# Patient Record
Sex: MALE | Race: WHITE | HISPANIC OR LATINO | Employment: FULL TIME | URBAN - METROPOLITAN AREA
[De-identification: names, ages, dates, MRNs, and addresses within clinical notes are randomized per-mention and may not be internally consistent; named-entity substitution may affect disease eponyms.]

---

## 2022-08-22 ENCOUNTER — APPOINTMENT (OUTPATIENT)
Dept: LAB | Age: 32
End: 2022-08-22
Payer: COMMERCIAL

## 2022-08-22 DIAGNOSIS — E78.5 HYPERLIPIDEMIA, UNSPECIFIED HYPERLIPIDEMIA TYPE: ICD-10-CM

## 2022-08-22 LAB
ALBUMIN SERPL BCP-MCNC: 3.8 G/DL (ref 3.5–5)
ALP SERPL-CCNC: 48 U/L (ref 46–116)
ALT SERPL W P-5'-P-CCNC: 46 U/L (ref 12–78)
ANION GAP SERPL CALCULATED.3IONS-SCNC: 8 MMOL/L (ref 4–13)
AST SERPL W P-5'-P-CCNC: 26 U/L (ref 5–45)
BASOPHILS # BLD AUTO: 0.12 THOUSANDS/ΜL (ref 0–0.1)
BASOPHILS NFR BLD AUTO: 1 % (ref 0–1)
BILIRUB SERPL-MCNC: 0.58 MG/DL (ref 0.2–1)
BUN SERPL-MCNC: 15 MG/DL (ref 5–25)
CALCIUM SERPL-MCNC: 9 MG/DL (ref 8.3–10.1)
CHLORIDE SERPL-SCNC: 105 MMOL/L (ref 96–108)
CHOLEST SERPL-MCNC: 203 MG/DL
CO2 SERPL-SCNC: 25 MMOL/L (ref 21–32)
CREAT SERPL-MCNC: 1.22 MG/DL (ref 0.6–1.3)
EOSINOPHIL # BLD AUTO: 0.31 THOUSAND/ΜL (ref 0–0.61)
EOSINOPHIL NFR BLD AUTO: 3 % (ref 0–6)
ERYTHROCYTE [DISTWIDTH] IN BLOOD BY AUTOMATED COUNT: 12.5 % (ref 11.6–15.1)
ERYTHROCYTE [SEDIMENTATION RATE] IN BLOOD: 12 MM/HOUR (ref 0–14)
GFR SERPL CREATININE-BSD FRML MDRD: 78 ML/MIN/1.73SQ M
GLUCOSE P FAST SERPL-MCNC: 96 MG/DL (ref 65–99)
HCT VFR BLD AUTO: 44.9 % (ref 36.5–49.3)
HDLC SERPL-MCNC: 34 MG/DL
HGB BLD-MCNC: 14.8 G/DL (ref 12–17)
IMM GRANULOCYTES # BLD AUTO: 0.12 THOUSAND/UL (ref 0–0.2)
IMM GRANULOCYTES NFR BLD AUTO: 1 % (ref 0–2)
LDLC SERPL CALC-MCNC: 131 MG/DL (ref 0–100)
LYMPHOCYTES # BLD AUTO: 3.49 THOUSANDS/ΜL (ref 0.6–4.47)
LYMPHOCYTES NFR BLD AUTO: 34 % (ref 14–44)
MCH RBC QN AUTO: 28.6 PG (ref 26.8–34.3)
MCHC RBC AUTO-ENTMCNC: 33 G/DL (ref 31.4–37.4)
MCV RBC AUTO: 87 FL (ref 82–98)
MONOCYTES # BLD AUTO: 0.78 THOUSAND/ΜL (ref 0.17–1.22)
MONOCYTES NFR BLD AUTO: 8 % (ref 4–12)
NEUTROPHILS # BLD AUTO: 5.37 THOUSANDS/ΜL (ref 1.85–7.62)
NEUTS SEG NFR BLD AUTO: 53 % (ref 43–75)
NONHDLC SERPL-MCNC: 169 MG/DL
NRBC BLD AUTO-RTO: 0 /100 WBCS
PLATELET # BLD AUTO: 384 THOUSANDS/UL (ref 149–390)
PMV BLD AUTO: 9.9 FL (ref 8.9–12.7)
POTASSIUM SERPL-SCNC: 4.2 MMOL/L (ref 3.5–5.3)
PROT SERPL-MCNC: 8 G/DL (ref 6.4–8.4)
RBC # BLD AUTO: 5.18 MILLION/UL (ref 3.88–5.62)
SODIUM SERPL-SCNC: 138 MMOL/L (ref 135–147)
TRIGL SERPL-MCNC: 190 MG/DL
TSH SERPL DL<=0.05 MIU/L-ACNC: 2.61 UIU/ML (ref 0.45–4.5)
WBC # BLD AUTO: 10.19 THOUSAND/UL (ref 4.31–10.16)

## 2022-08-22 PROCEDURE — 36415 COLL VENOUS BLD VENIPUNCTURE: CPT

## 2022-08-22 PROCEDURE — 80053 COMPREHEN METABOLIC PANEL: CPT

## 2022-08-22 PROCEDURE — 85025 COMPLETE CBC W/AUTO DIFF WBC: CPT

## 2022-08-22 PROCEDURE — 84443 ASSAY THYROID STIM HORMONE: CPT

## 2022-08-22 PROCEDURE — 80061 LIPID PANEL: CPT

## 2022-08-22 PROCEDURE — 85652 RBC SED RATE AUTOMATED: CPT

## 2024-12-13 ENCOUNTER — OFFICE VISIT (OUTPATIENT)
Dept: GASTROENTEROLOGY | Facility: CLINIC | Age: 34
End: 2024-12-13
Payer: COMMERCIAL

## 2024-12-13 VITALS
BODY MASS INDEX: 31.97 KG/M2 | HEART RATE: 67 BPM | WEIGHT: 241.2 LBS | HEIGHT: 73 IN | SYSTOLIC BLOOD PRESSURE: 137 MMHG | DIASTOLIC BLOOD PRESSURE: 88 MMHG

## 2024-12-13 DIAGNOSIS — R10.13 EPIGASTRIC PAIN: Primary | ICD-10-CM

## 2024-12-13 DIAGNOSIS — R19.7 DIARRHEA OF PRESUMED INFECTIOUS ORIGIN: ICD-10-CM

## 2024-12-13 DIAGNOSIS — K60.2 ANAL FISSURE: ICD-10-CM

## 2024-12-13 PROCEDURE — 99203 OFFICE O/P NEW LOW 30 MIN: CPT | Performed by: INTERNAL MEDICINE

## 2024-12-13 NOTE — PROGRESS NOTES
Name: Paul Alvarez      : 1990      MRN: [id number]  Encounter Provider: Lobo Lane MD  Encounter Date: 2024   Encounter department: [facility] GASTROENTEROLOGY [place] [address]  :  Assessment & Plan  Epigastric pain  Agree that symptoms are highly suggestive of biliary dyskinesia and CCK stimulated HIDA scan would be the test of choice for further evaluation.  Will reorder this in the hopes it will be covered.  Differential diagnosis would also include peptic ulcer disease, H. pylori, etc. though these seem much less likely based on patient's symptomatology.  Contingency might include further evaluation with EGD.  Will avoid further NSAIDs  Orders:    NM hepatobiliary w rx; Future    Diarrhea of presumed infectious origin  Likely traveler's diarrhea, now resolved.  May have developed postinfectious functional symptoms contributing to his ongoing abdominal discomfort       Anal fissure  We discussed use of OTC fiber and sitz baths.  Discussed possible further evaluation with colonoscopy but will hold off on this for now           History of Present Illness   HPI  Paul Alvarez is a 33 y.o. male who presents with ongoing abdominal pain.  Reports symptoms began following a trip to [place] in  at which time he developed profuse watery diarrhea.  During this time he took a fair amount of NSAIDs but has not been using these since.  Has since developed onset of upper abdominal pain typically 20 to 30 minutes after eating fatty foods.  Abdominal ultrasound was unrevealing and CCK stimulated HIDA scan ordered to evaluate for biliary dyskinesia but denied by patient's insurance.  Symptoms have been gradually improving over time.  Patient also reports developing scant bright red spotting of blood with bowel movements especially on occasions when he has harder stool and straining and associated with sharp tearing pain.  This has occurred episodically over the past several months lasting  "several days at a time.      Review of Systems       Objective   /88 (BP Location: Left arm, Patient Position: Sitting, Cuff Size: Standard)   Pulse 67   Ht 6' 1\" (1.854 m)   Wt 109 kg (241 lb 3.2 oz)   BMI 31.82 kg/m²      Physical Exam  Vitals and nursing note reviewed.   Constitutional:       General: He is not in acute distress.  HENT:      Head: Normocephalic and atraumatic.      Mouth/Throat:      Mouth: Mucous membranes are moist.   Eyes:      General: No scleral icterus.     Pupils: Pupils are equal, round, and reactive to light.   Cardiovascular:      Rate and Rhythm: Normal rate and regular rhythm.   Pulmonary:      Effort: Pulmonary effort is normal. No respiratory distress.   Abdominal:      General: There is no distension.      Palpations: Abdomen is soft.      Tenderness: There is no abdominal tenderness. There is no guarding or rebound.   Musculoskeletal:         General: Normal range of motion.      Cervical back: Normal range of motion and neck supple.      Right lower leg: No edema.      Left lower leg: No edema.   Skin:     General: Skin is warm and dry.   Neurological:      General: No focal deficit present.      Mental Status: He is alert and oriented to person, place, and time.   Psychiatric:         Mood and Affect: Mood normal.         Behavior: Behavior normal.           "

## 2025-01-02 ENCOUNTER — HOSPITAL ENCOUNTER (OUTPATIENT)
Dept: NUCLEAR MEDICINE | Facility: HOSPITAL | Age: 35
Discharge: HOME | End: 2025-01-02
Attending: INTERNAL MEDICINE
Payer: COMMERCIAL

## 2025-01-02 DIAGNOSIS — R10.13 EPIGASTRIC PAIN: ICD-10-CM

## 2025-01-02 PROCEDURE — A9537 TC99M MEBROFENIN: HCPCS

## 2025-01-02 PROCEDURE — 78227 HEPATOBIL SYST IMAGE W/DRUG: CPT

## 2025-01-02 RX ORDER — SINCALIDE 5 UG/5ML
0.02 INJECTION, POWDER, LYOPHILIZED, FOR SOLUTION INTRAVENOUS ONCE
Status: COMPLETED | OUTPATIENT
Start: 2025-01-02 | End: 2025-01-02

## 2025-01-02 RX ADMIN — SINCALIDE 2.2 MCG: 5 INJECTION, POWDER, LYOPHILIZED, FOR SOLUTION INTRAVENOUS at 14:25

## 2025-01-31 ENCOUNTER — RESULTS FOLLOW-UP (OUTPATIENT)
Dept: GASTROENTEROLOGY | Facility: CLINIC | Age: 35
End: 2025-01-31

## 2025-01-31 DIAGNOSIS — K82.8 BILIARY DYSKINESIA: Primary | ICD-10-CM

## 2025-01-31 NOTE — RESULT ENCOUNTER NOTE
Please call the patient regarding results.  HIDA scan consistent with biliary dyskinesia.  This is likely the cause of his pain and treatment is generally cholecystectomy if the pain persists, though this is unlikely to cause any serious complications.  Will arrange referral to surgery to discuss this further.

## 2025-02-03 NOTE — TELEPHONE ENCOUNTER
Patient returned phone call. Call warm transferred to clinical team to review results with patient.

## 2025-02-03 NOTE — TELEPHONE ENCOUNTER
----- Message from Lobo Lane MD sent at 1/31/2025  4:21 PM EST -----  Please call the patient regarding results.  HIDA scan consistent with biliary dyskinesia.  This is likely the cause of his pain and treatment is generally cholecystectomy if the pain persists, though this is unlikely to cause any serious complications.  Will arrange referral to surgery to discuss this further.

## 2025-02-03 NOTE — TELEPHONE ENCOUNTER
Pt transferred to myself by Shahla.    HIDA scan results and recommendations reviewed with pt. Pt has general surgery consult scheduled 02/13.

## 2025-02-13 ENCOUNTER — OFFICE VISIT (OUTPATIENT)
Dept: SURGERY | Facility: CLINIC | Age: 35
End: 2025-02-13
Payer: COMMERCIAL

## 2025-02-13 VITALS
TEMPERATURE: 97.4 F | HEART RATE: 70 BPM | OXYGEN SATURATION: 98 % | BODY MASS INDEX: 32.02 KG/M2 | SYSTOLIC BLOOD PRESSURE: 132 MMHG | HEIGHT: 73 IN | RESPIRATION RATE: 16 BRPM | WEIGHT: 241.6 LBS | DIASTOLIC BLOOD PRESSURE: 82 MMHG

## 2025-02-13 DIAGNOSIS — K82.8 BILIARY DYSKINESIA: ICD-10-CM

## 2025-02-13 DIAGNOSIS — R10.32 LEFT LOWER QUADRANT ABDOMINAL PAIN: Primary | ICD-10-CM

## 2025-02-13 PROCEDURE — 99214 OFFICE O/P EST MOD 30 MIN: CPT

## 2025-02-13 NOTE — PROGRESS NOTES
Name: Paul Alvarez      : 1990      MRN: [id number]  Encounter Provider: Mary Anna  Encounter Date: 2025   [facility]  :  Assessment & Plan  Biliary dyskinesia  Pt is a 33 yo M following w GI for epigastric pain presenting as a consult for possible cholecystectomy. Pt completed HIDA scan 2025 showing biliary dyskinesia w EF 8%.   History completed today significant for improving pain after meals and mild daily epigastric and both RUQ and LUQ pain.      A/P  Given history/atypical presentation of stated pain, ordered CT scan to assess for other pathologies outside of the biliary tract.   Defer cholecystectomy until CT results are available.  F/U in one week.     Orders:    Ambulatory Referral to General Surgery    Left lower quadrant abdominal pain    Orders:    CT abdomen pelvis w contrast; Future        History of Present Illness   HPI  Paul Alvarez is a 34 y.o. male who presents as a consult for possible cholecystectomy. Pt reports epigastric pain began in 2024 while pt was in [place]. While there, he developed traveler's diarrhea. Pt recovered shortly, but had continued epigastric pain. Pt stated pain improved since the inciting event but does endorse daily mild epigastric pain throughout the day. He endorses worsening pain without eating and somewhat worsening pain after eating at times. He had one fever back in July but has not had any F/C, N/V since.     History obtained from: patient    Review of Systems   Constitutional:  Negative for activity change, appetite change, chills, fatigue and fever.   Cardiovascular:  Negative for chest pain and leg swelling.   Gastrointestinal:  Positive for abdominal pain. Negative for abdominal distention, blood in stool, constipation, diarrhea, nausea and vomiting.   Genitourinary:  Negative for dysuria, flank pain and hematuria.   Musculoskeletal:  Negative for back pain.   Skin:  Negative  "for rash.          Objective   /82 (Patient Position: Sitting)   Pulse 70   Temp (!) 97.4 °F (36.3 °C) (Temporal)   Resp 16   Ht 6' 1\" (1.854 m)   Wt 110 kg (241 lb 9.6 oz)   SpO2 98%   BMI 31.88 kg/m²      Physical Exam  Vitals reviewed.   Constitutional:       General: He is not in acute distress.     Appearance: Normal appearance. He is not ill-appearing.   HENT:      Head: Normocephalic and atraumatic.      Mouth/Throat:      Pharynx: Oropharynx is clear.   Pulmonary:      Effort: Pulmonary effort is normal.   Abdominal:      General: Bowel sounds are normal. There is no distension.      Palpations: Abdomen is soft.      Tenderness: There is no abdominal tenderness. There is no guarding.   Musculoskeletal:         General: No swelling, tenderness, deformity or signs of injury. Normal range of motion.   Skin:     General: Skin is warm and dry.   Neurological:      General: No focal deficit present.      Mental Status: He is alert and oriented to person, place, and time.   Psychiatric:         Mood and Affect: Mood normal.         Behavior: Behavior normal.           "

## 2025-02-17 ENCOUNTER — TELEPHONE (OUTPATIENT)
Age: 35
End: 2025-02-17

## 2025-02-17 NOTE — TELEPHONE ENCOUNTER
Pt called regarding CT scan he is suppose to have. Provided him with Central Scheduling to call and schedule

## 2025-02-19 ENCOUNTER — TELEPHONE (OUTPATIENT)
Age: 35
End: 2025-02-19

## 2025-02-19 NOTE — TELEPHONE ENCOUNTER
Patient called In because he scheduled his CT for this Sunday but just received notice his insurance has denied the test. He did not cancel CT yet because he wanted to see if there was anything we could do. Please call patient back if canceling test would be appropriate. He can be reached at [phone]

## 2025-05-01 ENCOUNTER — APPOINTMENT (EMERGENCY)
Dept: CT IMAGING | Facility: HOSPITAL | Age: 35
End: 2025-05-01
Payer: COMMERCIAL

## 2025-05-01 ENCOUNTER — HOSPITAL ENCOUNTER (EMERGENCY)
Facility: HOSPITAL | Age: 35
Discharge: HOME | End: 2025-05-01
Attending: EMERGENCY MEDICINE
Payer: COMMERCIAL

## 2025-05-01 VITALS
RESPIRATION RATE: 16 BRPM | WEIGHT: 235.01 LBS | BODY MASS INDEX: 31.01 KG/M2 | DIASTOLIC BLOOD PRESSURE: 89 MMHG | HEART RATE: 84 BPM | OXYGEN SATURATION: 97 % | SYSTOLIC BLOOD PRESSURE: 164 MMHG | TEMPERATURE: 98.4 F

## 2025-05-01 DIAGNOSIS — R10.9 ABDOMINAL PAIN: Primary | ICD-10-CM

## 2025-05-01 LAB
ALBUMIN SERPL BCG-MCNC: 5.1 G/DL (ref 3.5–5)
ALP SERPL-CCNC: 46 U/L (ref 34–104)
ALT SERPL W P-5'-P-CCNC: 30 U/L (ref 7–52)
ANION GAP SERPL CALCULATED.3IONS-SCNC: 12 MMOL/L (ref 4–13)
AST SERPL W P-5'-P-CCNC: 40 U/L (ref 13–39)
BASOPHILS # BLD AUTO: 0.04 THOUSANDS/ÂΜL (ref 0–0.1)
BASOPHILS NFR BLD AUTO: 0 % (ref 0–1)
BILIRUB SERPL-MCNC: 0.94 MG/DL (ref 0.2–1)
BUN SERPL-MCNC: 12 MG/DL (ref 5–25)
CALCIUM SERPL-MCNC: 9.7 MG/DL (ref 8.4–10.2)
CHLORIDE SERPL-SCNC: 102 MMOL/L (ref 96–108)
CO2 SERPL-SCNC: 24 MMOL/L (ref 21–32)
CREAT SERPL-MCNC: 1.25 MG/DL (ref 0.6–1.3)
EOSINOPHIL # BLD AUTO: 0.07 THOUSAND/ÂΜL (ref 0–0.61)
EOSINOPHIL NFR BLD AUTO: 1 % (ref 0–6)
ERYTHROCYTE [DISTWIDTH] IN BLOOD BY AUTOMATED COUNT: 11.7 % (ref 11.6–15.1)
GFR SERPL CREATININE-BSD FRML MDRD: 74 ML/MIN/1.73SQ M
GLUCOSE SERPL-MCNC: 87 MG/DL (ref 65–140)
HCT VFR BLD AUTO: 45.8 % (ref 36.5–49.3)
HGB BLD-MCNC: 15.3 G/DL (ref 12–17)
IMM GRANULOCYTES # BLD AUTO: 0.04 THOUSAND/UL (ref 0–0.2)
IMM GRANULOCYTES NFR BLD AUTO: 0 % (ref 0–2)
LIPASE SERPL-CCNC: 10 U/L (ref 11–82)
LYMPHOCYTES # BLD AUTO: 2.62 THOUSANDS/ÂΜL (ref 0.6–4.47)
LYMPHOCYTES NFR BLD AUTO: 29 % (ref 14–44)
MCH RBC QN AUTO: 28.9 PG (ref 26.8–34.3)
MCHC RBC AUTO-ENTMCNC: 33.4 G/DL (ref 31.4–37.4)
MCV RBC AUTO: 86 FL (ref 82–98)
MONOCYTES # BLD AUTO: 0.58 THOUSAND/ÂΜL (ref 0.17–1.22)
MONOCYTES NFR BLD AUTO: 6 % (ref 4–12)
NEUTROPHILS # BLD AUTO: 5.76 THOUSANDS/ÂΜL (ref 1.85–7.62)
NEUTS SEG NFR BLD AUTO: 64 % (ref 43–75)
NRBC BLD AUTO-RTO: 0 /100 WBCS
PLATELET # BLD AUTO: 368 THOUSANDS/UL (ref 149–390)
PMV BLD AUTO: 9.5 FL (ref 8.9–12.7)
POTASSIUM SERPL-SCNC: 3.6 MMOL/L (ref 3.5–5.3)
PROT SERPL-MCNC: 8.1 G/DL (ref 6.4–8.4)
RBC # BLD AUTO: 5.3 MILLION/UL (ref 3.88–5.62)
SODIUM SERPL-SCNC: 138 MMOL/L (ref 135–147)
WBC # BLD AUTO: 9.11 THOUSAND/UL (ref 4.31–10.16)

## 2025-05-01 PROCEDURE — 99285 EMERGENCY DEPT VISIT HI MDM: CPT | Performed by: EMERGENCY MEDICINE

## 2025-05-01 PROCEDURE — 74177 CT ABD & PELVIS W/CONTRAST: CPT

## 2025-05-01 PROCEDURE — 36415 COLL VENOUS BLD VENIPUNCTURE: CPT

## 2025-05-01 PROCEDURE — 83690 ASSAY OF LIPASE: CPT

## 2025-05-01 PROCEDURE — 85025 COMPLETE CBC W/AUTO DIFF WBC: CPT

## 2025-05-01 PROCEDURE — 99284 EMERGENCY DEPT VISIT MOD MDM: CPT

## 2025-05-01 PROCEDURE — 80053 COMPREHEN METABOLIC PANEL: CPT

## 2025-05-01 RX ORDER — PANTOPRAZOLE SODIUM 40 MG/1
40 TABLET, DELAYED RELEASE ORAL DAILY
Qty: 30 TABLET | Refills: 0 | Status: SHIPPED | OUTPATIENT
Start: 2025-05-01

## 2025-05-01 RX ORDER — SUCRALFATE 1 G/1
1 TABLET ORAL 4 TIMES DAILY
Qty: 60 TABLET | Refills: 0 | Status: SHIPPED | OUTPATIENT
Start: 2025-05-01

## 2025-05-01 RX ADMIN — IOHEXOL 100 ML: 350 INJECTION, SOLUTION INTRAVENOUS at 20:32

## 2025-05-01 NOTE — ED PROVIDER NOTES
Time reflects when diagnosis was documented in both MDM as applicable and the Disposition within this note       Time User Action Codes Description Comment    5/1/2025  9:40 PM Tripp Haynes Add [R10.9] Abdominal pain           ED Disposition       ED Disposition   Discharge    Condition   Stable    Date/Time   Thu May 1, 2025  9:40 PM    Comment   Paul Alvarez discharge to home/self care.                   Assessment & Plan       Medical Decision Making  34-year-old male presents for evaluation of abdominal pain.  Well-appearing male, no acute distress, resting comfortably in bed.  Mild epigastric and right upper quadrant tenderness.  Differentials include but not limited to gastritis, cholelithiasis, cholecystitis, hepatitis, pancreatitis.  Will obtain labs, imaging and treat symptomatically.    See ED course for further MDM.    Amount and/or Complexity of Data Reviewed  Labs: ordered. Decision-making details documented in ED Course.  Radiology: ordered. Decision-making details documented in ED Course.    Risk  Prescription drug management.        ED Course as of 05/03/25 0234   u May 01, 2025   1959 LIPASE(!): 10   1959 Comprehensive metabolic panel(!)  Unremarkable     1959 CBC and differential  Unremarkable     2131 CT abdomen pelvis with contrast  IMPRESSION:     1) No acute abdominal or pelvic pathology.     2) No bowel obstruction. Normal appendix. Evaluation for bowel inflammation elsewhere somewhat limited by underdistention and lack of oral contrast, however no convincing inflammatory changes. Please note mild inflammation may not be apparent.     3) No CT evidence of acute cholecystitis. No radiopaque cholelithiasis or choledocholithiasis. No biliary ductal dilatation.     4) Additional findings as above.     2132 Given normal CT findings, pt stable for discharge at this time. Will discharge with carafate and protonix for symptomatic management       Medications   iohexol (OMNIPAQUE) 350 MG/ML  injection (MULTI-DOSE) 100 mL (100 mL Intravenous Given 5/1/25 2032)       ED Risk Strat Scores                    No data recorded                            History of Present Illness       Chief Complaint   Patient presents with    Abdominal Pain     Pt reports having issues with his gallbladder since last summer. Reports lack of appetite, RUQ and LUQ abdominal pain for a few days. Denies other complaints       Past Medical History:   Diagnosis Date    Sleep apnea, obstructive       History reviewed. No pertinent surgical history.   History reviewed. No pertinent family history.       E-Cigarette/Vaping      E-Cigarette/Vaping Substances      I have reviewed and agree with the history as documented.     35 yo male presents for evaluation of abdominal pain. He has a hx of biliary dyskinesia, and reports 2 days of decreased appetite, RUQ and LUQ abdominal pain. No associated nausea or vomiting. Decreased PO intake, due to no appetite. Pain usually occurs after he eats, and he is afraid to eat because of the pain. He has been seen by general surgery outpatient, who recommends CT prior to cholecystectomy. Had some issurnace problems, which delayed the CT. He presents because the pain has been persistent, and not improving. Denies fever, chills, nausea, vomiting, diarrhea, chest pain.          Review of Systems   Constitutional:  Negative for fever.   Respiratory:  Negative for shortness of breath.    Gastrointestinal:  Positive for abdominal pain. Negative for nausea.   All other systems reviewed and are negative.          Objective       ED Triage Vitals [05/01/25 1817]   Temperature Pulse Blood Pressure Respirations SpO2 Patient Position - Orthostatic VS   98.4 °F (36.9 °C) 84 164/89 16 97 % Sitting      Temp Source Heart Rate Source BP Location FiO2 (%) Pain Score    Oral Monitor Right arm -- 5      Vitals      Date and Time Temp Pulse SpO2 Resp BP Pain Score FACES Pain Rating User   05/01/25 1817 98.4 °F (36.9  °C) 84 97 % 16 164/89 5 -- TB            Physical Exam  Vitals and nursing note reviewed.   Constitutional:       General: He is not in acute distress.     Appearance: He is well-developed.   HENT:      Head: Normocephalic and atraumatic.   Eyes:      Conjunctiva/sclera: Conjunctivae normal.   Cardiovascular:      Rate and Rhythm: Normal rate and regular rhythm.      Heart sounds: No murmur heard.  Pulmonary:      Effort: Pulmonary effort is normal. No respiratory distress.      Breath sounds: Normal breath sounds.   Abdominal:      Palpations: Abdomen is soft.      Tenderness: There is abdominal tenderness in the epigastric area.   Musculoskeletal:         General: No swelling.      Cervical back: Neck supple.   Skin:     General: Skin is warm and dry.      Capillary Refill: Capillary refill takes less than 2 seconds.   Neurological:      Mental Status: He is alert.   Psychiatric:         Mood and Affect: Mood normal.         Results Reviewed       Procedure Component Value Units Date/Time    Lipase [[id number]]  (Abnormal) Collected: 05/01/25 1853    Lab Status: Final result Specimen: Blood from Arm, Right Updated: 05/01/25 1944     Lipase 10 u/L     Comprehensive metabolic panel [[id number]]  (Abnormal) Collected: 05/01/25 1853    Lab Status: Final result Specimen: Blood from Arm, Right Updated: 05/01/25 1944     Sodium 138 mmol/L      Potassium 3.6 mmol/L      Chloride 102 mmol/L      CO2 24 mmol/L      ANION GAP 12 mmol/L      BUN 12 mg/dL      Creatinine 1.25 mg/dL      Glucose 87 mg/dL      Calcium 9.7 mg/dL      AST 40 U/L      ALT 30 U/L      Alkaline Phosphatase 46 U/L      Total Protein 8.1 g/dL      Albumin 5.1 g/dL      Total Bilirubin 0.94 mg/dL      eGFR 74 ml/min/1.73sq m     Narrative:      National Kidney Disease Foundation guidelines for Chronic Kidney Disease (CKD):     Stage 1 with normal or high GFR (GFR > 90 mL/min/1.73 square meters)    Stage 2 Mild CKD (GFR = 60-89 mL/min/1.73 square meters)     Stage 3A Moderate CKD (GFR = 45-59 mL/min/1.73 square meters)    Stage 3B Moderate CKD (GFR = 30-44 mL/min/1.73 square meters)    Stage 4 Severe CKD (GFR = 15-29 mL/min/1.73 square meters)    Stage 5 End Stage CKD (GFR <15 mL/min/1.73 square meters)  Note: GFR calculation is accurate only with a steady state creatinine    CBC and differential [[id number]] Collected: 05/01/25 8604    Lab Status: Final result Specimen: Blood from Arm, Right Updated: 05/01/25 1908     WBC 9.11 Thousand/uL      RBC 5.30 Million/uL      Hemoglobin 15.3 g/dL      Hematocrit 45.8 %      MCV 86 fL      MCH 28.9 pg      MCHC 33.4 g/dL      RDW 11.7 %      MPV 9.5 fL      Platelets 368 Thousands/uL      nRBC 0 /100 WBCs      Segmented % 64 %      Immature Grans % 0 %      Lymphocytes % 29 %      Monocytes % 6 %      Eosinophils Relative 1 %      Basophils Relative 0 %      Absolute Neutrophils 5.76 Thousands/µL      Absolute Immature Grans 0.04 Thousand/uL      Absolute Lymphocytes 2.62 Thousands/µL      Absolute Monocytes 0.58 Thousand/µL      Eosinophils Absolute 0.07 Thousand/µL      Basophils Absolute 0.04 Thousands/µL             CT abdomen pelvis with contrast   Final Interpretation by Yaa Kennedy MD (05/01 2110)      1) No acute abdominal or pelvic pathology.      2) No bowel obstruction. Normal appendix. Evaluation for bowel inflammation elsewhere somewhat limited by underdistention and lack of oral contrast, however no convincing inflammatory changes. Please note mild inflammation may not be apparent.      3) No CT evidence of acute cholecystitis. No radiopaque cholelithiasis or choledocholithiasis. No biliary ductal dilatation.      4) Additional findings as above.                     Workstation performed: [id number]             Procedures    ED Medication and Procedure Management   None     Discharge Medication List as of 5/1/2025  9:51 PM        START taking these medications    Details   pantoprazole (PROTONIX) 40 mg tablet  Take 1 tablet (40 mg total) by mouth daily, Starting Thu 5/1/2025, Normal      sucralfate (CARAFATE) 1 g tablet Take 1 tablet (1 g total) by mouth 4 (four) times a day, Starting Thu 5/1/2025, Normal           No discharge procedures on file.  ED SEPSIS DOCUMENTATION   Time reflects when diagnosis was documented in both MDM as applicable and the Disposition within this note       Time User Action Codes Description Comment    5/1/2025  9:40 PM Tripp Haynes Add [R10.9] Abdominal pain                  Tripp Haynes MD  05/03/25 8405

## 2025-05-02 NOTE — ED ATTENDING ATTESTATION
5/1/2025  I, Agueda Farley MD, saw and evaluated the patient. I have discussed the patient with the resident/non-physician practitioner and agree with the resident's/non-physician practitioner's findings, Plan of Care, and MDM as documented in the resident's/non-physician practitioner's note, except where noted. All available labs and Radiology studies were reviewed.  I was present for key portions of any procedure(s) performed by the resident/non-physician practitioner and I was immediately available to provide assistance.       At this point I agree with the current assessment done in the Emergency Department.  I have conducted an independent evaluation of this patient a history and physical is as follows:    34-year-old presented to the ER with intermittent upper abdominal pain.  Being evaluated by surgery for outpatient biliary disease.  Was supposed to get a CAT scan but has not yet.  No nausea vomiting.  Normal bowel movements.  No chest pain or trouble breathing.  No fevers or chills.  Asymptomatic at this time.  Abdomen soft, no tenderness.    Agree with plan, check abdominal labs, CT       ED Course         Critical Care Time  Procedures

## 2025-05-06 ENCOUNTER — TELEPHONE (OUTPATIENT)
Age: 35
End: 2025-05-06

## 2025-05-06 NOTE — TELEPHONE ENCOUNTER
Patient called in to say he completed his CT Scan and is wondering now what the next step should be.  Please call the patient to advise, thanks.

## 2025-05-09 NOTE — TELEPHONE ENCOUNTER
Patient calling back as he has not gotten a return call on what his next steps would be. He is anxious to get a return call and can be reached at [phone]

## 2025-05-20 ENCOUNTER — OFFICE VISIT (OUTPATIENT)
Dept: SURGERY | Facility: CLINIC | Age: 35
End: 2025-05-20
Payer: COMMERCIAL

## 2025-05-20 ENCOUNTER — PREP FOR PROCEDURE (OUTPATIENT)
Dept: SURGERY | Facility: CLINIC | Age: 35
End: 2025-05-20

## 2025-05-20 VITALS
TEMPERATURE: 98.2 F | SYSTOLIC BLOOD PRESSURE: 122 MMHG | DIASTOLIC BLOOD PRESSURE: 77 MMHG | BODY MASS INDEX: 30.74 KG/M2 | HEART RATE: 77 BPM | OXYGEN SATURATION: 99 % | WEIGHT: 233 LBS

## 2025-05-20 DIAGNOSIS — K82.8 BILIARY DYSKINESIA: Primary | ICD-10-CM

## 2025-05-20 DIAGNOSIS — R10.9 ABDOMINAL PAIN: ICD-10-CM

## 2025-05-20 PROCEDURE — 99203 OFFICE O/P NEW LOW 30 MIN: CPT | Performed by: SURGERY

## 2025-05-20 RX ORDER — SODIUM CHLORIDE, SODIUM LACTATE, POTASSIUM CHLORIDE, CALCIUM CHLORIDE 600; 310; 30; 20 MG/100ML; MG/100ML; MG/100ML; MG/100ML
20 INJECTION, SOLUTION INTRAVENOUS CONTINUOUS
Status: CANCELLED | OUTPATIENT
Start: 2025-05-20

## 2025-05-20 RX ORDER — SUCRALFATE 1 G/1
1 TABLET ORAL 4 TIMES DAILY
Qty: 60 TABLET | Refills: 0 | Status: SHIPPED | OUTPATIENT
Start: 2025-05-20

## 2025-05-20 NOTE — PROGRESS NOTES
Name: Paul Alvarez      : 1990      MRN: [id number]  Encounter Provider: General Surgery Generic Physician Dino  Encounter Date: 2025   Encounter department: [facility] SURGERY [place]  :  Assessment & Plan  Abdominal pain  Pt to follow with GI to discuss possible EGD    Orders:    sucralfate (CARAFATE) 1 g tablet; Take 1 tablet (1 g total) by mouth 4 (four) times a day    Biliary dyskinesia  Long discussion with patient about risk and benefits of gallbladder removal.  The patient understands that this procedure may be nontherapeutic, or that his symptoms may not totally resolve with cholecystectomy.  Risks and benefits of bile leak and injury to surrounding structure were also discussed  Consent signed      History of Present Illness   HPI  Paul Alvarez is a 34 y.o. male who presents with abdominal pain.  Patient has been having pain over the course of a few months and has had a workup including a CAT scan of his abdomen and a HIDA scan.  The CAT scan of his abdomen did not show any pathology but the HIDA scan did show significant contractile dysfunction of the gallbladder.  Upon reviewing his symptoms the patient does say that when he eats fatty food he has bandlike pain across the middle of his abdomen that happens approximately 30 minutes after eating.  This is associated with some nausea but no vomiting. patient also says that he has separate left upper quadrant pain that is more intermittent in nature, is more of a burning type pain, and is sometimes associated with blood in his stools.  The patient denies any other associated symptoms.  History obtained from: patient    Review of Systems   Constitutional: Negative.    HENT: Negative.     Respiratory: Negative.     Cardiovascular: Negative.    Gastrointestinal:         As noted in HPI   Genitourinary: Negative.    Musculoskeletal: Negative.    Neurological: Negative.    Psychiatric/Behavioral: Negative.       Pertinent Medical  History         Medical History Reviewed by provider this encounter: .  Past Medical History   Past Medical History:   Diagnosis Date    Sleep apnea, obstructive      No past surgical history on file.  No family history on file.   reports that he has never smoked. He has never been exposed to tobacco smoke. He uses smokeless tobacco.  Current Outpatient Medications   Medication Instructions    pantoprazole (PROTONIX) 40 mg, Oral, Daily    sucralfate (CARAFATE) 1 g, Oral, 4 times daily   Allergies[1]   Medications Ordered Prior to Encounter[2]   Social History     Tobacco Use    Smoking status: Never     Passive exposure: Never    Smokeless tobacco: Current   Substance and Sexual Activity    Alcohol use: Not on file    Drug use: Not on file    Sexual activity: Not on file        Objective   /77 (BP Location: Right arm, Patient Position: Sitting, Cuff Size: Standard)   Pulse 77   Temp 98.2 °F (36.8 °C) (Temporal)   Wt 106 kg (233 lb)   SpO2 99%   BMI 30.74 kg/m²      Physical Exam  Constitutional:       Appearance: Normal appearance. He is not ill-appearing.   HENT:      Head: Normocephalic.      Right Ear: Tympanic membrane normal.      Left Ear: Tympanic membrane normal.      Nose: Nose normal. No congestion.     Eyes:      Pupils: Pupils are equal, round, and reactive to light.       Cardiovascular:      Rate and Rhythm: Normal rate and regular rhythm.      Heart sounds: No murmur heard.  Pulmonary:      Effort: Pulmonary effort is normal.      Breath sounds: Normal breath sounds. No stridor.   Abdominal:      General: Abdomen is flat. There is no distension.      Palpations: Abdomen is soft. There is no mass.      Tenderness: There is no rebound.      Hernia: No hernia is present.     Musculoskeletal:         General: No swelling or tenderness. Normal range of motion.      Cervical back: Normal range of motion and neck supple. No tenderness.     Skin:     General: Skin is warm.      Coloration: Skin is  not jaundiced.      Findings: No bruising.     Neurological:      General: No focal deficit present.      Mental Status: He is alert. Mental status is at baseline.     Psychiatric:         Mood and Affect: Mood normal.         Behavior: Behavior normal.         Thought Content: Thought content normal.         Judgment: Judgment normal.                  [1] No Known Allergies  [2]   Current Outpatient Medications on File Prior to Visit   Medication Sig Dispense Refill    pantoprazole (PROTONIX) 40 mg tablet Take 1 tablet (40 mg total) by mouth daily 30 tablet 0    [DISCONTINUED] sucralfate (CARAFATE) 1 g tablet Take 1 tablet (1 g total) by mouth 4 (four) times a day 60 tablet 0     No current facility-administered medications on file prior to visit.

## 2025-05-20 NOTE — ASSESSMENT & PLAN NOTE
Long discussion with patient about risk and benefits of gallbladder removal.  The patient understands that this procedure may be nontherapeutic, or that his symptoms may not totally resolve with cholecystectomy.  Risks and benefits of bile leak and injury to surrounding structure were also discussed  Consent signed

## 2025-05-20 NOTE — H&P (VIEW-ONLY)
Name: Paul Alvarez      : 1990      MRN: [id number]  Encounter Provider: General Surgery Generic Physician Dino  Encounter Date: 2025   Encounter department: [facility]  :  Assessment & Plan  Abdominal pain  Pt to follow with GI to discuss possible EGD    Orders:    sucralfate (CARAFATE) 1 g tablet; Take 1 tablet (1 g total) by mouth 4 (four) times a day    Biliary dyskinesia  Long discussion with patient about risk and benefits of gallbladder removal.  The patient understands that this procedure may be nontherapeutic, or that his symptoms may not totally resolve with cholecystectomy.  Risks and benefits of bile leak and injury to surrounding structure were also discussed  Consent signed      History of Present Illness  HPI  Paul Alvarez is a 34 y.o. male who presents with abdominal pain.  Patient has been having pain over the course of a few months and has had a workup including a CAT scan of his abdomen and a HIDA scan.  The CAT scan of his abdomen did not show any pathology but the HIDA scan did show significant contractile dysfunction of the gallbladder.  Upon reviewing his symptoms the patient does say that when he eats fatty food he has bandlike pain across the middle of his abdomen that happens approximately 30 minutes after eating.  This is associated with some nausea but no vomiting. patient also says that he has separate left upper quadrant pain that is more intermittent in nature, is more of a burning type pain, and is sometimes associated with blood in his stools.  The patient denies any other associated symptoms.  History obtained from: patient    Review of Systems   Constitutional: Negative.    HENT: Negative.     Respiratory: Negative.     Cardiovascular: Negative.    Gastrointestinal:         As noted in HPI   Genitourinary: Negative.    Musculoskeletal: Negative.    Neurological: Negative.    Psychiatric/Behavioral: Negative.       Pertinent Medical  History        Medical History Reviewed by provider this encounter: .  Past Medical History  Past Medical History:   Diagnosis Date    Sleep apnea, obstructive      No past surgical history on file.  No family history on file.   reports that he has never smoked. He has never been exposed to tobacco smoke. He uses smokeless tobacco.  Current Outpatient Medications   Medication Instructions    pantoprazole (PROTONIX) 40 mg, Oral, Daily    sucralfate (CARAFATE) 1 g, Oral, 4 times daily   Allergies[1]   Medications Ordered Prior to Encounter[2]   Social History     Tobacco Use    Smoking status: Never     Passive exposure: Never    Smokeless tobacco: Current   Substance and Sexual Activity    Alcohol use: Not on file    Drug use: Not on file    Sexual activity: Not on file        Objective  /77 (BP Location: Right arm, Patient Position: Sitting, Cuff Size: Standard)   Pulse 77   Temp 98.2 °F (36.8 °C) (Temporal)   Wt 106 kg (233 lb)   SpO2 99%   BMI 30.74 kg/m²      Physical Exam  Constitutional:       Appearance: Normal appearance. He is not ill-appearing.   HENT:      Head: Normocephalic.      Right Ear: Tympanic membrane normal.      Left Ear: Tympanic membrane normal.      Nose: Nose normal. No congestion.     Eyes:      Pupils: Pupils are equal, round, and reactive to light.       Cardiovascular:      Rate and Rhythm: Normal rate and regular rhythm.      Heart sounds: No murmur heard.  Pulmonary:      Effort: Pulmonary effort is normal.      Breath sounds: Normal breath sounds. No stridor.   Abdominal:      General: Abdomen is flat. There is no distension.      Palpations: Abdomen is soft. There is no mass.      Tenderness: There is no rebound.      Hernia: No hernia is present.     Musculoskeletal:         General: No swelling or tenderness. Normal range of motion.      Cervical back: Normal range of motion and neck supple. No tenderness.     Skin:     General: Skin is warm.      Coloration: Skin is not  jaundiced.      Findings: No bruising.     Neurological:      General: No focal deficit present.      Mental Status: He is alert. Mental status is at baseline.     Psychiatric:         Mood and Affect: Mood normal.         Behavior: Behavior normal.         Thought Content: Thought content normal.         Judgment: Judgment normal.                   [1] No Known Allergies  [2]   Current Outpatient Medications on File Prior to Visit   Medication Sig Dispense Refill    pantoprazole (PROTONIX) 40 mg tablet Take 1 tablet (40 mg total) by mouth daily 30 tablet 0    [DISCONTINUED] sucralfate (CARAFATE) 1 g tablet Take 1 tablet (1 g total) by mouth 4 (four) times a day 60 tablet 0     No current facility-administered medications on file prior to visit.

## 2025-05-20 NOTE — H&P
Name: Paul Alvarez      : 1990      MRN: [id number]  Encounter Provider: General Surgery Generic Physician Dino  Encounter Date: 2025   Encounter department: [facility]  :  Assessment & Plan  Abdominal pain  Pt to follow with GI to discuss possible EGD    Orders:    sucralfate (CARAFATE) 1 g tablet; Take 1 tablet (1 g total) by mouth 4 (four) times a day    Biliary dyskinesia  Long discussion with patient about risk and benefits of gallbladder removal.  The patient understands that this procedure may be nontherapeutic, or that his symptoms may not totally resolve with cholecystectomy.  Risks and benefits of bile leak and injury to surrounding structure were also discussed  Consent signed      History of Present Illness  HPI  Paul Alvarez is a 34 y.o. male who presents with abdominal pain.  Patient has been having pain over the course of a few months and has had a workup including a CAT scan of his abdomen and a HIDA scan.  The CAT scan of his abdomen did not show any pathology but the HIDA scan did show significant contractile dysfunction of the gallbladder.  Upon reviewing his symptoms the patient does say that when he eats fatty food he has bandlike pain across the middle of his abdomen that happens approximately 30 minutes after eating.  This is associated with some nausea but no vomiting. patient also says that he has separate left upper quadrant pain that is more intermittent in nature, is more of a burning type pain, and is sometimes associated with blood in his stools.  The patient denies any other associated symptoms.  History obtained from: patient    Review of Systems   Constitutional: Negative.    HENT: Negative.     Respiratory: Negative.     Cardiovascular: Negative.    Gastrointestinal:         As noted in HPI   Genitourinary: Negative.    Musculoskeletal: Negative.    Neurological: Negative.    Psychiatric/Behavioral: Negative.       Pertinent Medical  History        Medical History Reviewed by provider this encounter: .  Past Medical History  Past Medical History:   Diagnosis Date    Sleep apnea, obstructive      No past surgical history on file.  No family history on file.   reports that he has never smoked. He has never been exposed to tobacco smoke. He uses smokeless tobacco.  Current Outpatient Medications   Medication Instructions    pantoprazole (PROTONIX) 40 mg, Oral, Daily    sucralfate (CARAFATE) 1 g, Oral, 4 times daily   Allergies[1]   Medications Ordered Prior to Encounter[2]   Social History     Tobacco Use    Smoking status: Never     Passive exposure: Never    Smokeless tobacco: Current   Substance and Sexual Activity    Alcohol use: Not on file    Drug use: Not on file    Sexual activity: Not on file        Objective  /77 (BP Location: Right arm, Patient Position: Sitting, Cuff Size: Standard)   Pulse 77   Temp 98.2 °F (36.8 °C) (Temporal)   Wt 106 kg (233 lb)   SpO2 99%   BMI 30.74 kg/m²      Physical Exam  Constitutional:       Appearance: Normal appearance. He is not ill-appearing.   HENT:      Head: Normocephalic.      Right Ear: Tympanic membrane normal.      Left Ear: Tympanic membrane normal.      Nose: Nose normal. No congestion.     Eyes:      Pupils: Pupils are equal, round, and reactive to light.       Cardiovascular:      Rate and Rhythm: Normal rate and regular rhythm.      Heart sounds: No murmur heard.  Pulmonary:      Effort: Pulmonary effort is normal.      Breath sounds: Normal breath sounds. No stridor.   Abdominal:      General: Abdomen is flat. There is no distension.      Palpations: Abdomen is soft. There is no mass.      Tenderness: There is no rebound.      Hernia: No hernia is present.     Musculoskeletal:         General: No swelling or tenderness. Normal range of motion.      Cervical back: Normal range of motion and neck supple. No tenderness.     Skin:     General: Skin is warm.      Coloration: Skin is not  jaundiced.      Findings: No bruising.     Neurological:      General: No focal deficit present.      Mental Status: He is alert. Mental status is at baseline.     Psychiatric:         Mood and Affect: Mood normal.         Behavior: Behavior normal.         Thought Content: Thought content normal.         Judgment: Judgment normal.                   [1] No Known Allergies  [2]   Current Outpatient Medications on File Prior to Visit   Medication Sig Dispense Refill    pantoprazole (PROTONIX) 40 mg tablet Take 1 tablet (40 mg total) by mouth daily 30 tablet 0    [DISCONTINUED] sucralfate (CARAFATE) 1 g tablet Take 1 tablet (1 g total) by mouth 4 (four) times a day 60 tablet 0     No current facility-administered medications on file prior to visit.

## 2025-06-02 ENCOUNTER — OFFICE VISIT (OUTPATIENT)
Dept: GASTROENTEROLOGY | Facility: CLINIC | Age: 35
End: 2025-06-02
Payer: COMMERCIAL

## 2025-06-02 ENCOUNTER — TELEPHONE (OUTPATIENT)
Dept: GASTROENTEROLOGY | Facility: CLINIC | Age: 35
End: 2025-06-02

## 2025-06-02 VITALS
HEIGHT: 73 IN | BODY MASS INDEX: 30.88 KG/M2 | HEART RATE: 59 BPM | WEIGHT: 233 LBS | DIASTOLIC BLOOD PRESSURE: 80 MMHG | SYSTOLIC BLOOD PRESSURE: 130 MMHG

## 2025-06-02 DIAGNOSIS — R10.12 LUQ PAIN: ICD-10-CM

## 2025-06-02 DIAGNOSIS — K82.8 BILIARY DYSKINESIA: Primary | ICD-10-CM

## 2025-06-02 DIAGNOSIS — K62.5 RECTAL BLEED: ICD-10-CM

## 2025-06-02 PROCEDURE — 99214 OFFICE O/P EST MOD 30 MIN: CPT | Performed by: PHYSICIAN ASSISTANT

## 2025-06-02 RX ORDER — SODIUM CHLORIDE, SODIUM LACTATE, POTASSIUM CHLORIDE, CALCIUM CHLORIDE 600; 310; 30; 20 MG/100ML; MG/100ML; MG/100ML; MG/100ML
125 INJECTION, SOLUTION INTRAVENOUS CONTINUOUS
Status: CANCELLED | OUTPATIENT
Start: 2025-06-02

## 2025-06-02 NOTE — TELEPHONE ENCOUNTER
Scheduled date of EGD/colonoscopy (as of today): 6/11/25  Physician performing EGD/colonoscopy: Dr Lane  Location of EGD/colonoscopy:   Desired bowel prep reviewed with patient: Miralax w/ dul given at appt   Instructions reviewed with patient by: kennedy  Clearances:  n/a

## 2025-06-02 NOTE — ASSESSMENT & PLAN NOTE
Patient with biliary dyskinesia, noted to have ejection fraction of 8% on HIDA scan with CCK.  Plan is for upcoming cholecystectomy but would recommend EGD prior to this.

## 2025-06-02 NOTE — H&P (VIEW-ONLY)
Name: Paul Alvarez      : 1990      MRN: [id number]  Encounter Provider: Margaret Romero PA-C  Encounter Date: 2025   Encounter department: [facility] [place] [address]  :  Assessment & Plan  Biliary dyskinesia  Patient with biliary dyskinesia, noted to have ejection fraction of 8% on HIDA scan with CCK.  Plan is for upcoming cholecystectomy but would recommend EGD prior to this.       Rectal bleed  Patient with rectal bleeding and this is likely outlet pathology as discussed previously was suspected to be anal fissure but still was having persistent bleeding although he reports it has improved recently but would proceed with colonoscopy at time of EGD  Orders:    Colonoscopy; Future    LUQ pain  Patient with left upper quadrant pain rule out any gastritis H. pylori infection and plan for EGD, I have arranged these prior to cholecystectomy, I did explain that if there would be any pertinent findings that he may need to postpone cholecystectomy pending his course.  He can continue pantoprazole as well as Carafate  Orders:    EGD; Future        History of Present Illness     Paul Alvarez is a 34 y.o. male who presents for follow-up office visit.Patient was seen in December and was having diarrheal symptoms and then his findings were consistent with biliary dyskinesia and underwent HIDA with CCK which was positive for biliary dyskinesia and he states that he did get some cramping with the CCK administration.  He reports that he still has left upper quadrant pain which he describes as a cramp and he was taking pantoprazole and Carafate and he went to see the surgeon who recommended that he have an EGD prior to cholecystectomy.  Patient denies any excessive NSAID use.  He did have travel to [place] and was having diarrhea last year but he still was having rectal bleeding and he states that he is been taking the pantoprazole and the Carafate and then the bleeding stopped.   "He denies any actual melena.  States blood is darker red.  He had previously complained of a tearing sensation in his anal area with bowel movements especially with straining.  He has never had endoscopy or colonoscopy.  CT was done on May 1 which showed no acute abdominal pathology, no bowel obstruction.                    Jan 2025-    IMPRESSION:     1. Abnormal contractile response of the gallbladder to cholecystokinin infusion. Finding suspicious for biliary dyskinesia. (8%)        Review of Systems A complete review of systems is negative other than that noted above in the HPI.      Current Medications[1]  Objective   /80 (BP Location: Left arm, Patient Position: Sitting, Cuff Size: Standard)   Pulse 59   Ht 6' 1\" (1.854 m)   Wt 106 kg (233 lb)   BMI 30.74 kg/m²     Physical Exam     Gen-alert, nad  Heart-s1/s2  Lungs-CTA b/l  Abd-soft, +bs, NT, ND, no r/r/g            Lab Results: I personally reviewed relevant lab results.                  [1]   Current Outpatient Medications   Medication Sig Dispense Refill    pantoprazole (PROTONIX) 40 mg tablet Take 1 tablet (40 mg total) by mouth daily 30 tablet 0    sucralfate (CARAFATE) 1 g tablet Take 1 tablet (1 g total) by mouth 4 (four) times a day 60 tablet 0     No current facility-administered medications for this visit.     "

## 2025-06-04 NOTE — PRE-PROCEDURE INSTRUCTIONS
Pre-Surgery Instructions:   Medication Instructions    pantoprazole (PROTONIX) 40 mg tablet Take day of surgery.    sucralfate (CARAFATE) 1 g tablet Take day of surgery.      Medication instructions for day of surgery reviewed. Please take all instructed medications with only a sip of water. Please do not take any over the counter (non-prescribed) vitamins or supplements for one week prior to date of surgery.      You will receive a call one business day prior to surgery with an arrival time and hospital directions. If your surgery is scheduled on a Monday, the hospital will be calling you on the Friday prior to your surgery. If you have not heard from anyone by 8pm, please call the hospital supervisor through the hospital  at [phone]. ([place] [phone] or [place] [phone]).    Do not eat or drink anything after midnight the night before your surgery, including candy, mints, lifesavers, or chewing gum. Do not drink alcohol 24hrs before your surgery. Try not to smoke at least 24hrs before your surgery.       Follow the pre surgery showering instructions as listed in the “My Surgical Experience Booklet” or otherwise provided by your surgeon's office. Do not use a blade to shave the surgical area 1 week before surgery. It is okay to use a clean electric clippers up to 24 hours before surgery. Do not apply any lotions, creams, including makeup, cologne, deodorant, or perfumes after showering on the day of your surgery. Do not use dry shampoo, hair spray, hair gel, or any type of hair products.     No contact lenses, eye make-up, or artificial eyelashes. Remove nail polish, including gel polish, and any artificial, gel, or acrylic nails if possible. Remove all jewelry including rings and body piercing jewelry.     Wear causal clothing that is easy to take on and off. Consider your type of surgery.    Keep any valuables, jewelry, piercings at home. Please bring any specially ordered equipment  (sling, braces) if indicated.    Arrange for a responsible person to drive you to and from the hospital on the day of your surgery. Please confirm the visitor policy for the day of your procedure when you receive your phone call with an arrival time.     Call the surgeon's office with any new illnesses, exposures, or additional questions prior to surgery.    Please reference your “My Surgical Experience Booklet” for additional information to prepare for your upcoming surgery.

## 2025-06-06 ENCOUNTER — APPOINTMENT (OUTPATIENT)
Dept: LAB | Age: 35
End: 2025-06-06
Payer: COMMERCIAL

## 2025-06-06 LAB
ALBUMIN SERPL BCG-MCNC: 4.7 G/DL (ref 3.5–5)
ALP SERPL-CCNC: 64 U/L (ref 34–104)
ALT SERPL W P-5'-P-CCNC: 16 U/L (ref 7–52)
ANION GAP SERPL CALCULATED.3IONS-SCNC: 9 MMOL/L (ref 4–13)
AST SERPL W P-5'-P-CCNC: 16 U/L (ref 13–39)
BILIRUB SERPL-MCNC: 0.82 MG/DL (ref 0.2–1)
BUN SERPL-MCNC: 16 MG/DL (ref 5–25)
CALCIUM SERPL-MCNC: 9.4 MG/DL (ref 8.4–10.2)
CHLORIDE SERPL-SCNC: 102 MMOL/L (ref 96–108)
CO2 SERPL-SCNC: 28 MMOL/L (ref 21–32)
CREAT SERPL-MCNC: 1.31 MG/DL (ref 0.6–1.3)
GFR SERPL CREATININE-BSD FRML MDRD: 70 ML/MIN/1.73SQ M
GLUCOSE P FAST SERPL-MCNC: 81 MG/DL (ref 65–99)
POTASSIUM SERPL-SCNC: 4.8 MMOL/L (ref 3.5–5.3)
PROT SERPL-MCNC: 7.5 G/DL (ref 6.4–8.4)
SODIUM SERPL-SCNC: 139 MMOL/L (ref 135–147)

## 2025-06-06 PROCEDURE — 36415 COLL VENOUS BLD VENIPUNCTURE: CPT

## 2025-06-06 PROCEDURE — 80053 COMPREHEN METABOLIC PANEL: CPT

## 2025-06-11 ENCOUNTER — ANESTHESIA (OUTPATIENT)
Dept: GASTROENTEROLOGY | Facility: HOSPITAL | Age: 35
End: 2025-06-11
Payer: COMMERCIAL

## 2025-06-11 ENCOUNTER — HOSPITAL ENCOUNTER (OUTPATIENT)
Dept: GASTROENTEROLOGY | Facility: HOSPITAL | Age: 35
Setting detail: OUTPATIENT SURGERY
Discharge: HOME | End: 2025-06-11
Attending: PHYSICIAN ASSISTANT
Payer: COMMERCIAL

## 2025-06-11 ENCOUNTER — ANESTHESIA EVENT (OUTPATIENT)
Dept: GASTROENTEROLOGY | Facility: HOSPITAL | Age: 35
End: 2025-06-11
Payer: COMMERCIAL

## 2025-06-11 VITALS
HEART RATE: 59 BPM | DIASTOLIC BLOOD PRESSURE: 59 MMHG | RESPIRATION RATE: 14 BRPM | TEMPERATURE: 98.5 F | OXYGEN SATURATION: 99 % | SYSTOLIC BLOOD PRESSURE: 112 MMHG

## 2025-06-11 DIAGNOSIS — K62.5 RECTAL BLEED: ICD-10-CM

## 2025-06-11 DIAGNOSIS — R10.12 LUQ PAIN: ICD-10-CM

## 2025-06-11 PROBLEM — G47.30 SLEEP APNEA: Status: ACTIVE | Noted: 2025-06-11

## 2025-06-11 PROCEDURE — 88305 TISSUE EXAM BY PATHOLOGIST: CPT | Performed by: PATHOLOGY

## 2025-06-11 PROCEDURE — 43239 EGD BIOPSY SINGLE/MULTIPLE: CPT | Performed by: INTERNAL MEDICINE

## 2025-06-11 PROCEDURE — 45378 DIAGNOSTIC COLONOSCOPY: CPT | Performed by: INTERNAL MEDICINE

## 2025-06-11 RX ORDER — LIDOCAINE HYDROCHLORIDE 20 MG/ML
INJECTION, SOLUTION EPIDURAL; INFILTRATION; INTRACAUDAL; PERINEURAL AS NEEDED
Status: DISCONTINUED | OUTPATIENT
Start: 2025-06-11 | End: 2025-06-11

## 2025-06-11 RX ORDER — PROPOFOL 10 MG/ML
INJECTION, EMULSION INTRAVENOUS AS NEEDED
Status: DISCONTINUED | OUTPATIENT
Start: 2025-06-11 | End: 2025-06-11

## 2025-06-11 RX ORDER — ONDANSETRON 2 MG/ML
4 INJECTION INTRAMUSCULAR; INTRAVENOUS ONCE AS NEEDED
Status: CANCELLED | OUTPATIENT
Start: 2025-06-11

## 2025-06-11 RX ORDER — SODIUM CHLORIDE, SODIUM LACTATE, POTASSIUM CHLORIDE, CALCIUM CHLORIDE 600; 310; 30; 20 MG/100ML; MG/100ML; MG/100ML; MG/100ML
INJECTION, SOLUTION INTRAVENOUS CONTINUOUS PRN
Status: DISCONTINUED | OUTPATIENT
Start: 2025-06-11 | End: 2025-06-11

## 2025-06-11 RX ADMIN — PROPOFOL 100 MG: 10 INJECTION, EMULSION INTRAVENOUS at 10:23

## 2025-06-11 RX ADMIN — SODIUM CHLORIDE, SODIUM LACTATE, POTASSIUM CHLORIDE, AND CALCIUM CHLORIDE: .6; .31; .03; .02 INJECTION, SOLUTION INTRAVENOUS at 10:18

## 2025-06-11 RX ADMIN — PROPOFOL 50 MG: 10 INJECTION, EMULSION INTRAVENOUS at 10:25

## 2025-06-11 RX ADMIN — PROPOFOL 50 MG: 10 INJECTION, EMULSION INTRAVENOUS at 10:29

## 2025-06-11 RX ADMIN — PROPOFOL 50 MG: 10 INJECTION, EMULSION INTRAVENOUS at 10:24

## 2025-06-11 RX ADMIN — LIDOCAINE HYDROCHLORIDE 100 MG: 20 INJECTION, SOLUTION EPIDURAL; INFILTRATION; INTRACAUDAL; PERINEURAL at 10:22

## 2025-06-11 RX ADMIN — PROPOFOL 50 MG: 10 INJECTION, EMULSION INTRAVENOUS at 10:33

## 2025-06-11 RX ADMIN — PROPOFOL 100 MG: 10 INJECTION, EMULSION INTRAVENOUS at 10:22

## 2025-06-11 RX ADMIN — PROPOFOL 100 MCG/KG/MIN: 10 INJECTION, EMULSION INTRAVENOUS at 10:31

## 2025-06-11 RX ADMIN — PROPOFOL 50 MG: 10 INJECTION, EMULSION INTRAVENOUS at 10:27

## 2025-06-11 NOTE — ANESTHESIA POSTPROCEDURE EVALUATION
Post-Op Assessment Note    CV Status:  Stable    Pain management: adequate       Mental Status:  Alert and awake   Hydration Status:  Euvolemic   PONV Controlled:  Controlled   Airway Patency:  Patent     Post Op Vitals Reviewed: Yes    No anethesia notable event occurred.    Staff: CRNA           Last Filed PACU Vitals:  Vitals Value Taken Time   Temp 98.5 °F (36.9 °C) 06/11/25 10:49   Pulse 68 06/11/25 10:49   /58 06/11/25 10:49   Resp 16 06/11/25 10:49   SpO2 99 % 06/11/25 10:49       Modified Tay:     Vitals Value Taken Time   Activity 2 06/11/25 10:49   Respiration 2 06/11/25 10:49   Circulation 2 06/11/25 10:49   Consciousness 2 06/11/25 10:49   Oxygen Saturation 2 06/11/25 10:49     Modified Tay Score: 10

## 2025-06-11 NOTE — INTERVAL H&P NOTE
H&P reviewed. After examining the patient I find no changes in the patients condition since the H&P had been written.    Vitals:    06/11/25 0919   BP: 119/64   Pulse: 63   Resp: 16   Temp: 97.6 °F (36.4 °C)   SpO2: 100%

## 2025-06-11 NOTE — ANESTHESIA PREPROCEDURE EVALUATION
Procedure:  COLONOSCOPY  EGD    Relevant Problems   ANESTHESIA (within normal limits)      CARDIO (within normal limits)      ENDO (within normal limits)      GI/HEPATIC   (+) Biliary dyskinesia      MUSCULOSKELETAL   (+) Biliary dyskinesia      PULMONARY   (+) Sleep apnea        Physical Exam    Airway     Mallampati score: II  TM Distance: >3 FB  Neck ROM: full  Mouth opening: >= 4 cm      Cardiovascular  Rhythm: regular, Rate: normal    Dental   No notable dental hx     Pulmonary   Breath sounds clear to auscultation    Neurological    He appears awake, alert and oriented x3.      Other Findings        Anesthesia Plan  ASA Score- 2     Anesthesia Type- IV sedation with anesthesia with ASA Monitors.         Additional Monitors:     Airway Plan:            Plan Factors-Exercise tolerance (METS): >4 METS.    Chart reviewed.        Patient is not a current smoker.              Induction-     Postoperative Plan- .   Monitoring Plan - Monitoring plan - standard ASA monitoring  Post Operative Pain Plan - non-opiod analgesics        Informed Consent- Anesthetic plan and risks discussed with patient.  I personally reviewed this patient with the CRNA. Discussed and agreed on the Anesthesia Plan with the CRNA..      NPO Status:  Vitals Value Taken Time   Date of last liquid 06/11/25 06/11/25 09:15   Time of last liquid 0500 06/11/25 09:15   Date of last solid 06/09/25 06/11/25 09:15   Time of last solid 1800 06/11/25 09:15

## 2025-06-12 ENCOUNTER — ANESTHESIA EVENT (OUTPATIENT)
Dept: PERIOP | Facility: HOSPITAL | Age: 35
End: 2025-06-12
Payer: COMMERCIAL

## 2025-06-12 PROBLEM — K21.9 GASTROESOPHAGEAL REFLUX DISEASE: Status: ACTIVE | Noted: 2025-06-12

## 2025-06-12 RX ORDER — SODIUM CHLORIDE, SODIUM LACTATE, POTASSIUM CHLORIDE, CALCIUM CHLORIDE 600; 310; 30; 20 MG/100ML; MG/100ML; MG/100ML; MG/100ML
100 INJECTION, SOLUTION INTRAVENOUS CONTINUOUS
Status: CANCELLED | OUTPATIENT
Start: 2025-06-12

## 2025-06-12 RX ORDER — LIDOCAINE HYDROCHLORIDE 10 MG/ML
0.5 INJECTION, SOLUTION EPIDURAL; INFILTRATION; INTRACAUDAL; PERINEURAL ONCE AS NEEDED
Status: CANCELLED | OUTPATIENT
Start: 2025-06-12

## 2025-06-12 RX ORDER — ONDANSETRON 2 MG/ML
4 INJECTION INTRAMUSCULAR; INTRAVENOUS ONCE AS NEEDED
Status: CANCELLED | OUTPATIENT
Start: 2025-06-12

## 2025-06-12 RX ORDER — FENTANYL CITRATE/PF 50 MCG/ML
50 SYRINGE (ML) INJECTION
Refills: 0 | Status: CANCELLED | OUTPATIENT
Start: 2025-06-12

## 2025-06-12 RX ORDER — ACETAMINOPHEN 325 MG/1
975 TABLET ORAL ONCE
Status: CANCELLED | OUTPATIENT
Start: 2025-06-12 | End: 2025-06-12

## 2025-06-12 RX ORDER — HYDROMORPHONE HCL/PF 1 MG/ML
0.25 SYRINGE (ML) INJECTION
Status: CANCELLED | OUTPATIENT
Start: 2025-06-12

## 2025-06-13 ENCOUNTER — ANESTHESIA (OUTPATIENT)
Dept: PERIOP | Facility: HOSPITAL | Age: 35
End: 2025-06-13
Payer: COMMERCIAL

## 2025-06-13 ENCOUNTER — APPOINTMENT (OUTPATIENT)
Dept: RADIOLOGY | Facility: HOSPITAL | Age: 35
End: 2025-06-13
Payer: COMMERCIAL

## 2025-06-13 ENCOUNTER — HOSPITAL ENCOUNTER (OUTPATIENT)
Facility: HOSPITAL | Age: 35
Setting detail: OUTPATIENT SURGERY
Discharge: HOME | End: 2025-06-13
Attending: SURGERY | Admitting: SURGERY
Payer: COMMERCIAL

## 2025-06-13 VITALS
BODY MASS INDEX: 29.82 KG/M2 | OXYGEN SATURATION: 100 % | TEMPERATURE: 96.5 F | RESPIRATION RATE: 16 BRPM | HEART RATE: 50 BPM | WEIGHT: 225 LBS | DIASTOLIC BLOOD PRESSURE: 84 MMHG | HEIGHT: 73 IN | SYSTOLIC BLOOD PRESSURE: 132 MMHG

## 2025-06-13 DIAGNOSIS — Z90.49 S/P LAPAROSCOPIC CHOLECYSTECTOMY: Primary | ICD-10-CM

## 2025-06-13 DIAGNOSIS — K82.8 BILIARY DYSKINESIA: ICD-10-CM

## 2025-06-13 PROCEDURE — 88304 TISSUE EXAM BY PATHOLOGIST: CPT | Performed by: STUDENT IN AN ORGANIZED HEALTH CARE EDUCATION/TRAINING PROGRAM

## 2025-06-13 PROCEDURE — 88305 TISSUE EXAM BY PATHOLOGIST: CPT | Performed by: PATHOLOGY

## 2025-06-13 PROCEDURE — 47562 LAPAROSCOPIC CHOLECYSTECTOMY: CPT | Performed by: PHYSICIAN ASSISTANT

## 2025-06-13 PROCEDURE — 47562 LAPAROSCOPIC CHOLECYSTECTOMY: CPT | Performed by: SURGERY

## 2025-06-13 RX ORDER — SODIUM CHLORIDE, SODIUM LACTATE, POTASSIUM CHLORIDE, CALCIUM CHLORIDE 600; 310; 30; 20 MG/100ML; MG/100ML; MG/100ML; MG/100ML
INJECTION, SOLUTION INTRAVENOUS CONTINUOUS PRN
Status: DISCONTINUED | OUTPATIENT
Start: 2025-06-13 | End: 2025-06-13

## 2025-06-13 RX ORDER — ONDANSETRON 2 MG/ML
4 INJECTION INTRAMUSCULAR; INTRAVENOUS EVERY 6 HOURS PRN
Status: DISCONTINUED | OUTPATIENT
Start: 2025-06-13 | End: 2025-06-13 | Stop reason: HOSPADM

## 2025-06-13 RX ORDER — EPHEDRINE SULFATE 50 MG/ML
INJECTION INTRAVENOUS AS NEEDED
Status: DISCONTINUED | OUTPATIENT
Start: 2025-06-13 | End: 2025-06-13

## 2025-06-13 RX ORDER — FENTANYL CITRATE 50 UG/ML
INJECTION, SOLUTION INTRAMUSCULAR; INTRAVENOUS AS NEEDED
Status: DISCONTINUED | OUTPATIENT
Start: 2025-06-13 | End: 2025-06-13

## 2025-06-13 RX ORDER — FENTANYL CITRATE/PF 50 MCG/ML
50 SYRINGE (ML) INJECTION
Status: DISCONTINUED | OUTPATIENT
Start: 2025-06-13 | End: 2025-06-13 | Stop reason: HOSPADM

## 2025-06-13 RX ORDER — ONDANSETRON 2 MG/ML
4 INJECTION INTRAMUSCULAR; INTRAVENOUS ONCE AS NEEDED
Status: DISCONTINUED | OUTPATIENT
Start: 2025-06-13 | End: 2025-06-13 | Stop reason: HOSPADM

## 2025-06-13 RX ORDER — PROPOFOL 10 MG/ML
INJECTION, EMULSION INTRAVENOUS AS NEEDED
Status: DISCONTINUED | OUTPATIENT
Start: 2025-06-13 | End: 2025-06-13

## 2025-06-13 RX ORDER — SODIUM CHLORIDE, SODIUM LACTATE, POTASSIUM CHLORIDE, CALCIUM CHLORIDE 600; 310; 30; 20 MG/100ML; MG/100ML; MG/100ML; MG/100ML
20 INJECTION, SOLUTION INTRAVENOUS CONTINUOUS
Status: DISCONTINUED | OUTPATIENT
Start: 2025-06-13 | End: 2025-06-13 | Stop reason: HOSPADM

## 2025-06-13 RX ORDER — SODIUM CHLORIDE, SODIUM LACTATE, POTASSIUM CHLORIDE, CALCIUM CHLORIDE 600; 310; 30; 20 MG/100ML; MG/100ML; MG/100ML; MG/100ML
125 INJECTION, SOLUTION INTRAVENOUS CONTINUOUS
Status: DISCONTINUED | OUTPATIENT
Start: 2025-06-13 | End: 2025-06-13 | Stop reason: HOSPADM

## 2025-06-13 RX ORDER — BUPIVACAINE HYDROCHLORIDE 5 MG/ML
INJECTION, SOLUTION EPIDURAL; INTRACAUDAL; PERINEURAL AS NEEDED
Status: DISCONTINUED | OUTPATIENT
Start: 2025-06-13 | End: 2025-06-13 | Stop reason: HOSPADM

## 2025-06-13 RX ORDER — ROCURONIUM BROMIDE 10 MG/ML
INJECTION, SOLUTION INTRAVENOUS AS NEEDED
Status: DISCONTINUED | OUTPATIENT
Start: 2025-06-13 | End: 2025-06-13

## 2025-06-13 RX ORDER — MAGNESIUM HYDROXIDE 1200 MG/15ML
LIQUID ORAL AS NEEDED
Status: DISCONTINUED | OUTPATIENT
Start: 2025-06-13 | End: 2025-06-13 | Stop reason: HOSPADM

## 2025-06-13 RX ORDER — OXYCODONE HYDROCHLORIDE 5 MG/1
5 TABLET ORAL EVERY 4 HOURS PRN
Qty: 18 TABLET | Refills: 0 | Status: SHIPPED | OUTPATIENT
Start: 2025-06-13 | End: 2025-06-16

## 2025-06-13 RX ORDER — GLYCOPYRROLATE 0.2 MG/ML
INJECTION INTRAMUSCULAR; INTRAVENOUS AS NEEDED
Status: DISCONTINUED | OUTPATIENT
Start: 2025-06-13 | End: 2025-06-13

## 2025-06-13 RX ORDER — ACETAMINOPHEN 325 MG/1
650 TABLET ORAL EVERY 6 HOURS PRN
Status: DISCONTINUED | OUTPATIENT
Start: 2025-06-13 | End: 2025-06-13 | Stop reason: HOSPADM

## 2025-06-13 RX ORDER — DEXAMETHASONE SODIUM PHOSPHATE 10 MG/ML
INJECTION, SOLUTION INTRAMUSCULAR; INTRAVENOUS AS NEEDED
Status: DISCONTINUED | OUTPATIENT
Start: 2025-06-13 | End: 2025-06-13

## 2025-06-13 RX ORDER — HYDROMORPHONE HCL/PF 1 MG/ML
0.25 SYRINGE (ML) INJECTION
Status: DISCONTINUED | OUTPATIENT
Start: 2025-06-13 | End: 2025-06-13 | Stop reason: HOSPADM

## 2025-06-13 RX ORDER — MORPHINE SULFATE 10 MG/ML
4 INJECTION, SOLUTION INTRAMUSCULAR; INTRAVENOUS EVERY 4 HOURS PRN
Status: DISCONTINUED | OUTPATIENT
Start: 2025-06-13 | End: 2025-06-13 | Stop reason: HOSPADM

## 2025-06-13 RX ORDER — ONDANSETRON 2 MG/ML
INJECTION INTRAMUSCULAR; INTRAVENOUS AS NEEDED
Status: DISCONTINUED | OUTPATIENT
Start: 2025-06-13 | End: 2025-06-13

## 2025-06-13 RX ORDER — CEFAZOLIN SODIUM 2 G/50ML
2000 SOLUTION INTRAVENOUS EVERY 8 HOURS
Status: DISCONTINUED | OUTPATIENT
Start: 2025-06-13 | End: 2025-06-13 | Stop reason: HOSPADM

## 2025-06-13 RX ORDER — MIDAZOLAM HYDROCHLORIDE 2 MG/2ML
INJECTION, SOLUTION INTRAMUSCULAR; INTRAVENOUS AS NEEDED
Status: DISCONTINUED | OUTPATIENT
Start: 2025-06-13 | End: 2025-06-13

## 2025-06-13 RX ORDER — LIDOCAINE HYDROCHLORIDE 20 MG/ML
INJECTION, SOLUTION EPIDURAL; INFILTRATION; INTRACAUDAL; PERINEURAL AS NEEDED
Status: DISCONTINUED | OUTPATIENT
Start: 2025-06-13 | End: 2025-06-13

## 2025-06-13 RX ORDER — KETOROLAC TROMETHAMINE 30 MG/ML
INJECTION, SOLUTION INTRAMUSCULAR; INTRAVENOUS AS NEEDED
Status: DISCONTINUED | OUTPATIENT
Start: 2025-06-13 | End: 2025-06-13

## 2025-06-13 RX ADMIN — EPHEDRINE SULFATE 5 MG: 50 INJECTION, SOLUTION INTRAVENOUS at 08:37

## 2025-06-13 RX ADMIN — SODIUM CHLORIDE, SODIUM LACTATE, POTASSIUM CHLORIDE, AND CALCIUM CHLORIDE: .6; .31; .03; .02 INJECTION, SOLUTION INTRAVENOUS at 08:14

## 2025-06-13 RX ADMIN — KETOROLAC TROMETHAMINE 15 MG: 30 INJECTION, SOLUTION INTRAMUSCULAR; INTRAVENOUS at 09:18

## 2025-06-13 RX ADMIN — DEXMEDETOMIDINE HYDROCHLORIDE 4 MCG: 100 INJECTION, SOLUTION INTRAVENOUS at 08:58

## 2025-06-13 RX ADMIN — MIDAZOLAM 2 MG: 1 INJECTION INTRAMUSCULAR; INTRAVENOUS at 08:07

## 2025-06-13 RX ADMIN — DEXMEDETOMIDINE HYDROCHLORIDE 4 MCG: 100 INJECTION, SOLUTION INTRAVENOUS at 08:52

## 2025-06-13 RX ADMIN — SUGAMMADEX 200 MG: 100 INJECTION, SOLUTION INTRAVENOUS at 09:09

## 2025-06-13 RX ADMIN — CEFAZOLIN SODIUM 2000 MG: 2 SOLUTION INTRAVENOUS at 08:16

## 2025-06-13 RX ADMIN — FENTANYL CITRATE 50 MCG: 50 INJECTION INTRAMUSCULAR; INTRAVENOUS at 08:26

## 2025-06-13 RX ADMIN — ROCURONIUM BROMIDE 50 MG: 10 INJECTION, SOLUTION INTRAVENOUS at 08:11

## 2025-06-13 RX ADMIN — DEXAMETHASONE SODIUM PHOSPHATE 10 MG: 10 INJECTION, SOLUTION INTRAMUSCULAR; INTRAVENOUS at 08:12

## 2025-06-13 RX ADMIN — DEXMEDETOMIDINE HYDROCHLORIDE 4 MCG: 100 INJECTION, SOLUTION INTRAVENOUS at 08:48

## 2025-06-13 RX ADMIN — LIDOCAINE HYDROCHLORIDE 100 MG: 20 INJECTION, SOLUTION EPIDURAL; INFILTRATION; INTRACAUDAL; PERINEURAL at 08:10

## 2025-06-13 RX ADMIN — ONDANSETRON 4 MG: 2 INJECTION INTRAMUSCULAR; INTRAVENOUS at 09:01

## 2025-06-13 RX ADMIN — FENTANYL CITRATE 50 MCG: 50 INJECTION INTRAMUSCULAR; INTRAVENOUS at 08:10

## 2025-06-13 RX ADMIN — FENTANYL CITRATE 50 MCG: 50 INJECTION INTRAMUSCULAR; INTRAVENOUS at 09:28

## 2025-06-13 RX ADMIN — DEXMEDETOMIDINE HYDROCHLORIDE 4 MCG: 100 INJECTION, SOLUTION INTRAVENOUS at 09:18

## 2025-06-13 RX ADMIN — GLYCOPYRROLATE 0.2 MG: 0.2 INJECTION, SOLUTION INTRAMUSCULAR; INTRAVENOUS at 08:12

## 2025-06-13 RX ADMIN — PROPOFOL 200 MG: 10 INJECTION, EMULSION INTRAVENOUS at 08:10

## 2025-06-13 RX ADMIN — SODIUM CHLORIDE, SODIUM LACTATE, POTASSIUM CHLORIDE, AND CALCIUM CHLORIDE: .6; .31; .03; .02 INJECTION, SOLUTION INTRAVENOUS at 08:06

## 2025-06-13 RX ADMIN — DEXMEDETOMIDINE HYDROCHLORIDE 4 MCG: 100 INJECTION, SOLUTION INTRAVENOUS at 08:55

## 2025-06-13 NOTE — ANESTHESIA PREPROCEDURE EVALUATION
Procedure:  Laparoscopic cholecystectomy, possible intra-operative cholangiogram (Abdomen)  CHOLANGIOGRAM (Abdomen)    Relevant Problems   ANESTHESIA (within normal limits)   (-) History of anesthesia complications      CARDIO (within normal limits)      ENDO (within normal limits)      GI/HEPATIC   (+) Biliary dyskinesia   (+) Gastroesophageal reflux disease      /RENAL (within normal limits)      HEMATOLOGY (within normal limits)      MUSCULOSKELETAL   (+) Biliary dyskinesia      NEURO/PSYCH (within normal limits)      PULMONARY   (+) Sleep apnea      JANEL on CPAP    Physical Exam    Airway     Mallampati score: I  TM Distance: >3 FB  Neck ROM: full  Mouth opening: >= 4 cm      Cardiovascular  Rhythm: regular, Rate: normalCardiovascular exam normal    Dental   No notable dental hx     Pulmonary  Pulmonary exam normal Breath sounds clear to auscultation    Neurological    He appears awake and alert.      Other Findings        Anesthesia Plan  ASA Score- 2     Anesthesia Type- general with ASA Monitors.         Additional Monitors:     Airway Plan: Oral ETT.           Plan Factors-Exercise tolerance (METS): >4 METS.    Chart reviewed.   Existing labs reviewed. Patient summary reviewed.    Patient is not a current smoker.  Patient did not smoke on day of surgery.            Induction- intravenous.    Postoperative Plan- .   Monitoring Plan - Monitoring plan - standard ASA monitoring      Perioperative Resuscitation Plan - Level 1 - Full Code.       Informed Consent- Anesthetic plan and risks discussed with patient.  I personally reviewed this patient with the CRNA. Discussed and agreed on the Anesthesia Plan with the CRNA..      NPO Status:  Vitals Value Taken Time   Date of last liquid 06/12/25 06/13/25 06:51   Time of last liquid 2100 06/13/25 06:51   Date of last solid 06/12/25 06/13/25 06:51   Time of last solid 1930 06/13/25 06:51     NPO verified.  NKDA.    Plan:  GETA    Risks and benefits of general  anesthesia were discussed with the patient.  Discussed risks of anesthesia including, but not limited to, the risk of dental injury, n/v, sore throat, corneal abrasions, and arrhythmias.  All questions were answered.  Anesthesia consent was obtained from the patient.

## 2025-06-13 NOTE — ANESTHESIA POSTPROCEDURE EVALUATION
Post-Op Assessment Note    CV Status:  Stable    Pain management: adequate       Mental Status:  Alert and awake   Hydration Status:  Euvolemic and stable   PONV Controlled:  None   Airway Patency:  Patent and adequate     Post Op Vitals Reviewed: Yes    No anethesia notable event occurred.    Staff: Anesthesiologist, with CRNAs           Last Filed PACU Vitals:  Vitals Value Taken Time   Temp 97.2 °F (36.2 °C) 06/13/25 09:22   Pulse 66 06/13/25 09:55   /83 06/13/25 09:45   Resp 39 06/13/25 09:55   SpO2 98 % 06/13/25 09:55   Vitals shown include unfiled device data.    Modified Tay:     Vitals Value Taken Time   Activity 2 06/13/25 09:28   Respiration 2 06/13/25 09:28   Circulation 2 06/13/25 09:28   Consciousness 2 06/13/25 09:28   Oxygen Saturation 2 06/13/25 09:28     Modified Tay Score: 10

## 2025-06-13 NOTE — OP NOTE
OPERATIVE REPORT  PATIENT NAME: Paul Alvarez    :  1990  MRN: [id number]  Pt Location: [facility] OR ROOM 07    SURGERY DATE: 2025    Surgeons and Role:     * Berlin Arnold MD - Primary     * Muna Medrano PA-C - Assisting    Preop Diagnosis:  Biliary dyskinesia [K82.8]    Post-Op Diagnosis Codes:     * Biliary dyskinesia [K82.8]    Procedure(s):  Laparoscopic cholecystectomy. possible intra-operative cholangiogram    Specimen(s):  ID Type Source Tests Collected by Time Destination   1 :  Tissue Gallbladder TISSUE EXAM Berlin Arnold MD 2025 0735        Estimated Blood Loss:   Minimal    Drains:  * No LDAs found *    Anesthesia Type:   General    Operative Indications:  Biliary dyskinesia [K82.8]      Operative Findings:  Normal appearing GB       Complications:   None    Procedure and Technique:  Pt placed supine on table and prepped and draped in usual sterile manner. Timeout performed and all elements of timeout reviewed and confirmed. Laparoscopic equipment was checked and deemed adequate. An infraumbilical incision was made and dissection was taken down through anterior and posterior abdominal wall layers until an 11 mm trocar could be introduced. Abdomen was then insufflated to 15 mm HG pressure and direct inspection only showed a normal appearing Gallbladder. The subxiphoid and right sided ports were then placed under direct visualization and the gallbladder was retracted cephalad and laterally. The cystic duct and cystic artery were carefully skeletonized until a critical view could be accomplished. Once this was done these structures were sequentially clipped and divided. The gallbladder was removed from the gallbladder fossa with the laparoscopic hook and bovie cautery. It was placed in an Endocatch bag and removed through the infraumbilical port site without difficulty. The RUQ was irrigated with warm normal saline until the return fluid was clear of blood and bile. The ports were then  removed under direct visualization without difficulty. The subumbilical port site was closed with 2 figure of eight 0-vicryl sutures and the skin sites were closed with running 4-0 monocryl subcuticular suture and steri strips. Pt tolerated the procedure well, was transported to PACU in stable condition and sponge and instrument counts were correct.     I was present for the entire procedure.    Patient Disposition:  PACU   PA was essential to this case as there was no available resident and help was needed for retraction, exposure and dissection.         SIGNATURE: Berlin Arnold MD  DATE: June 13, 2025  TIME: 2:31 PM

## 2025-06-13 NOTE — ANESTHESIA POSTPROCEDURE EVALUATION
Post-Op Assessment Note    CV Status:  Stable  Pain Score: 0    Pain management: adequate       Mental Status:  Alert and awake   Hydration Status:  Euvolemic   PONV Controlled:  Controlled   Airway Patency:  Patent     Post Op Vitals Reviewed: Yes    No anethesia notable event occurred.            Last Filed PACU Vitals:  Vitals Value Taken Time   Temp     Pulse 71 06/13/25 09:24   /76 06/13/25 09:24   Resp 18 06/13/25 09:24   SpO2 98 % 06/13/25 09:24   Vitals shown include unfiled device data.

## 2025-06-19 PROCEDURE — 88304 TISSUE EXAM BY PATHOLOGIST: CPT | Performed by: STUDENT IN AN ORGANIZED HEALTH CARE EDUCATION/TRAINING PROGRAM

## 2025-07-03 ENCOUNTER — OFFICE VISIT (OUTPATIENT)
Dept: SURGERY | Facility: CLINIC | Age: 35
End: 2025-07-03

## 2025-07-03 VITALS
HEIGHT: 73 IN | WEIGHT: 225 LBS | DIASTOLIC BLOOD PRESSURE: 91 MMHG | OXYGEN SATURATION: 98 % | HEART RATE: 71 BPM | SYSTOLIC BLOOD PRESSURE: 126 MMHG | TEMPERATURE: 98 F | BODY MASS INDEX: 29.82 KG/M2

## 2025-07-03 DIAGNOSIS — K82.8 BILIARY DYSKINESIA: Primary | ICD-10-CM

## 2025-07-03 PROCEDURE — 99024 POSTOP FOLLOW-UP VISIT: CPT | Performed by: SURGERY

## 2025-07-03 NOTE — PROGRESS NOTES
Office Visit - General Surgery  Paul Alvarez MRN: [id number]  Encounter: [id number]  Assessment & Plan    Problem List Items Addressed This Visit       Biliary dyskinesia - Primary    S/p lap adam  - doing well  - pain controlled  - no weight restrictions  - path reviewed with patient  - f/u prn            Subjective    Paul Alvarez is a 34 y.o. male who presents for follow up evaluation s/p lap adam on 6/13/25. Doing well. No pain, tolerating diet. No f/c/n/v.     Pt  has a past medical history of CPAP (continuous positive airway pressure) dependence, GERD (gastroesophageal reflux disease), and Sleep apnea, obstructive.    Pt  has a past surgical history that includes Dental surgery and pr laparoscopy surg cholecystectomy (N/A, 6/13/2025).    family history is not on file.    Paul Alvarez reports that he has never smoked. He has never been exposed to tobacco smoke. He has never used smokeless tobacco. He reports that he does not currently use alcohol. He reports that he does not use drugs.      Medications Ordered Prior to Encounter[1]  Allergies[2]    Review of Systems   Constitutional: Negative.    Respiratory: Negative.     Cardiovascular: Negative.    Gastrointestinal: Negative.    Musculoskeletal: Negative.    Skin: Negative.    All other systems reviewed and are negative.      Objective    Vitals:    07/03/25 0823   BP: 126/91   Pulse: 71   Temp: 98 °F (36.7 °C)   SpO2: 98%       Physical Exam  Constitutional:       Appearance: Normal appearance. He is normal weight.     Cardiovascular:      Rate and Rhythm: Normal rate.      Pulses: Normal pulses.   Pulmonary:      Effort: Pulmonary effort is normal.   Abdominal:      General: There is no distension.      Palpations: Abdomen is soft.      Tenderness: There is no abdominal tenderness. There is no guarding or rebound.      Comments: Incisions well healed, no erythema/drainage/fluctuance     Musculoskeletal:         General: Normal range of motion.      Skin:     General: Skin is warm and dry.     Neurological:      General: No focal deficit present.                      [1]   Current Outpatient Medications on File Prior to Visit   Medication Sig Dispense Refill    [DISCONTINUED] pantoprazole (PROTONIX) 40 mg tablet Take 1 tablet (40 mg total) by mouth daily 30 tablet 0    [DISCONTINUED] sucralfate (CARAFATE) 1 g tablet Take 1 tablet (1 g total) by mouth 4 (four) times a day 60 tablet 0     No current facility-administered medications on file prior to visit.   [2] No Known Allergies

## 2025-07-03 NOTE — ASSESSMENT & PLAN NOTE
S/p lap adam  - doing well  - pain controlled  - no weight restrictions  - path reviewed with patient  - f/u prn

## 2025-07-22 ENCOUNTER — OFFICE VISIT (OUTPATIENT)
Dept: URGENT CARE | Age: 35
End: 2025-07-22
Payer: COMMERCIAL

## 2025-07-22 VITALS
RESPIRATION RATE: 18 BRPM | BODY MASS INDEX: 29.82 KG/M2 | TEMPERATURE: 97 F | HEART RATE: 54 BPM | HEIGHT: 73 IN | OXYGEN SATURATION: 99 % | WEIGHT: 225 LBS | SYSTOLIC BLOOD PRESSURE: 126 MMHG | DIASTOLIC BLOOD PRESSURE: 74 MMHG

## 2025-07-22 DIAGNOSIS — L25.5 RHUS DERMATITIS: Primary | ICD-10-CM

## 2025-07-22 PROCEDURE — 96372 THER/PROPH/DIAG INJ SC/IM: CPT | Performed by: NURSE PRACTITIONER

## 2025-07-22 PROCEDURE — 99203 OFFICE O/P NEW LOW 30 MIN: CPT | Performed by: NURSE PRACTITIONER

## 2025-07-22 RX ORDER — PREDNISONE 10 MG/1
TABLET ORAL
Qty: 36 TABLET | Refills: 0 | Status: SHIPPED | OUTPATIENT
Start: 2025-07-22

## 2025-07-22 RX ORDER — METHYLPREDNISOLONE ACETATE 40 MG/ML
80 INJECTION, SUSPENSION INTRA-ARTICULAR; INTRALESIONAL; INTRAMUSCULAR; SOFT TISSUE ONCE
Status: COMPLETED | OUTPATIENT
Start: 2025-07-22 | End: 2025-07-22

## 2025-07-22 RX ADMIN — METHYLPREDNISOLONE ACETATE 80 MG: 40 INJECTION, SUSPENSION INTRA-ARTICULAR; INTRALESIONAL; INTRAMUSCULAR; SOFT TISSUE at 08:41

## 2025-07-22 NOTE — PROGRESS NOTES
[facility]  Name: Paul Alvarez      : 1990      MRN: [id number]  Encounter Provider: ENRIQUE Dhillon  Encounter Date: 2025   [facility]  :  Assessment & Plan  Rhus dermatitis    Orders:    methylPREDNISolone acetate (DEPO-MEDROL) injection 80 mg    predniSONE 10 mg tablet; TAKE 6 TAB PO DAILY X 1 DAY, THEN 5 TAB DAILY X 2 DAYS, THEN 4 TAB DAILY X 2 DAYS, THEN 3 TAB DAILY X 2 DAYS, THEN 2 TAB DAILY X 2 DAYS, THEN 1 TAB DAILY X 2 DAYS        Patient Instructions    Patient Instructions   --Injectable steroid given.  Should start working in 30-60 minutes and last 12 hours (or more).  Starting tomorrow, begin oral steroid and take as prescribed  --Take Rx topical steroid as prescribed previously,  Avoid to face  --OTC Calamine lotion  --Seek re-evaluation if no improvement/worsening over the next 3-7 days     If tests are performed, our office will contact you with results only if changes need to made to the care plan discussed with you at the visit. [facility]    Chief Complaint:   Chief Complaint   Patient presents with    Poison Ivy     Patient has poison ivy on his right arm, some on his left arm and forehead area since Saturday- he was doing lawn work     History of Present Illness   Here with complaints of poison ivy.  Notes itchy, red rash starting on right arm 3 days ago.  Has since spread to other arm.  Starting to spread to trunk.  Nothing on face.    Using Rx topical steroid prescribed previously.  Mild improvement.           Review of Systems   Skin:  Positive for rash.     Past Medical History   Past Medical History[1]  Past Surgical History[1]  Family History[1]  he reports that he has never smoked. He has never been exposed to tobacco smoke. He has never used smokeless tobacco. He reports that he does not currently use alcohol. He reports that he does not use drugs.  Current Outpatient  "Medications   Medication Instructions    predniSONE 10 mg tablet TAKE 6 TAB PO DAILY X 1 DAY, THEN 5 TAB DAILY X 2 DAYS, THEN 4 TAB DAILY X 2 DAYS, THEN 3 TAB DAILY X 2 DAYS, THEN 2 TAB DAILY X 2 DAYS, THEN 1 TAB DAILY X 2 DAYS   Allergies[2]     Objective   /74 (BP Location: Right arm, Patient Position: Sitting, Cuff Size: Standard)   Pulse (!) 54   Temp (!) 97 °F (36.1 °C)   Resp 18   Ht 6' 1\" (1.854 m)   Wt 102 kg (225 lb)   SpO2 99%   BMI 29.69 kg/m²      Physical Exam    Skin:     Findings: Erythema and rash present.      Comments: Erythematous discrete and confluent plaques noted on R>L forearm with associated vesicles.       Neurological:      Mental Status: He is alert.         Portions of the record may have been created with voice recognition software.  Occasional wrong word or \"sound a like\" substitutions may have occurred due to the inherent limitations of voice recognition software.  Read the chart carefully and recognize, using context, where substitutions have occurred.         [1]   Past Medical History:  Diagnosis Date    CPAP (continuous positive airway pressure) dependence     GERD (gastroesophageal reflux disease)     Sleep apnea, obstructive    [1]   Past Surgical History:  Procedure Laterality Date    DENTAL SURGERY      [facility] LAPAROSCOPY SURG CHOLECYSTECTOMY N/A 6/13/2025    Procedure: Laparoscopic cholecystectomy, possible intra-operative cholangiogram;  Surgeon: Berlin Arnold MD;  Location: [facility];  Service: General   [1] No family history on file.  [2] No Known Allergies    "

## 2025-07-22 NOTE — PATIENT INSTRUCTIONS
--Injectable steroid given.  Should start working in 30-60 minutes and last 12 hours (or more).  Starting tomorrow, begin oral steroid and take as prescribed  --Take Rx topical steroid as prescribed previously,  Avoid to face  --OTC Calamine lotion  --Seek re-evaluation if no improvement/worsening over the next 3-7 days

## (undated) DEVICE — LAPAROSCOPIC WIRE J-HOOK ELECTRODE COATED: Brand: CLEANCOAT

## (undated) DEVICE — 3M™ STERI-STRIP™ COMPOUND BENZOIN TINCTURE 40 BAGS/CARTON 4 CARTONS/CASE C1544: Brand: 3M™ STERI-STRIP™

## (undated) DEVICE — PACK PBDS LAP CHOLE RF

## (undated) DEVICE — PLASTIC ADHESIVE BANDAGE: Brand: CURITY

## (undated) DEVICE — TROCAR: Brand: KII® SLEEVE

## (undated) DEVICE — REM POLYHESIVE ADULT PATIENT RETURN ELECTRODE: Brand: VALLEYLAB

## (undated) DEVICE — GLOVE INDICATOR PI UNDERGLOVE SZ 8 BLUE

## (undated) DEVICE — CLIP APPLIER WITH CLIP LOGIC TECHNOLOGY: Brand: ENDO CLIP III

## (undated) DEVICE — SUT MONOCRYL 4-0 PS-2 18 IN Y496G

## (undated) DEVICE — TROCAR: Brand: KII FIOS FIRST ENTRY

## (undated) DEVICE — PENCILETTE PUSH BUTTON COATED

## (undated) DEVICE — GLOVE SRG BIOGEL 8

## (undated) DEVICE — SUT VICRYL PLUS 0 UR-6 27IN VCP603H

## (undated) DEVICE — INTENDED FOR TISSUE SEPARATION, AND OTHER PROCEDURES THAT REQUIRE A SHARP SURGICAL BLADE TO PUNCTURE OR CUT.: Brand: BARD-PARKER SAFETY BLADES SIZE 11, STERILE

## (undated) DEVICE — 3M™ STERI-STRIP™ REINFORCED ADHESIVE SKIN CLOSURES, R1547, 1/2 IN X 4 IN (12 MM X 100 MM), 6 STRIPS/ENVELOPE: Brand: 3M™ STERI-STRIP™